# Patient Record
Sex: FEMALE | Race: BLACK OR AFRICAN AMERICAN
[De-identification: names, ages, dates, MRNs, and addresses within clinical notes are randomized per-mention and may not be internally consistent; named-entity substitution may affect disease eponyms.]

---

## 2021-12-13 ENCOUNTER — HOSPITAL ENCOUNTER (OUTPATIENT)
Dept: HOSPITAL 19 - COL.CAR | Age: 66
Discharge: HOME | End: 2021-12-13
Attending: INTERNAL MEDICINE
Payer: MEDICAID

## 2021-12-13 VITALS — SYSTOLIC BLOOD PRESSURE: 145 MMHG | HEART RATE: 55 BPM | DIASTOLIC BLOOD PRESSURE: 90 MMHG

## 2021-12-13 VITALS — TEMPERATURE: 98 F | HEART RATE: 58 BPM | DIASTOLIC BLOOD PRESSURE: 92 MMHG | SYSTOLIC BLOOD PRESSURE: 152 MMHG

## 2021-12-13 VITALS — DIASTOLIC BLOOD PRESSURE: 102 MMHG | HEART RATE: 79 BPM | SYSTOLIC BLOOD PRESSURE: 133 MMHG

## 2021-12-13 VITALS — SYSTOLIC BLOOD PRESSURE: 139 MMHG | DIASTOLIC BLOOD PRESSURE: 100 MMHG | HEART RATE: 68 BPM

## 2021-12-13 VITALS — SYSTOLIC BLOOD PRESSURE: 152 MMHG | HEART RATE: 58 BPM | DIASTOLIC BLOOD PRESSURE: 92 MMHG

## 2021-12-13 VITALS — WEIGHT: 188.5 LBS | BODY MASS INDEX: 31.4 KG/M2 | HEIGHT: 65 IN

## 2021-12-13 VITALS — DIASTOLIC BLOOD PRESSURE: 94 MMHG | SYSTOLIC BLOOD PRESSURE: 131 MMHG | HEART RATE: 82 BPM

## 2021-12-13 VITALS — SYSTOLIC BLOOD PRESSURE: 154 MMHG | HEART RATE: 81 BPM | DIASTOLIC BLOOD PRESSURE: 126 MMHG

## 2021-12-13 VITALS — HEART RATE: 59 BPM | DIASTOLIC BLOOD PRESSURE: 94 MMHG | SYSTOLIC BLOOD PRESSURE: 139 MMHG

## 2021-12-13 VITALS — DIASTOLIC BLOOD PRESSURE: 111 MMHG | HEART RATE: 55 BPM | SYSTOLIC BLOOD PRESSURE: 156 MMHG

## 2021-12-13 VITALS — HEART RATE: 90 BPM | DIASTOLIC BLOOD PRESSURE: 110 MMHG | SYSTOLIC BLOOD PRESSURE: 130 MMHG

## 2021-12-13 DIAGNOSIS — I50.22: ICD-10-CM

## 2021-12-13 DIAGNOSIS — I25.10: Primary | ICD-10-CM

## 2021-12-13 DIAGNOSIS — I11.0: ICD-10-CM

## 2021-12-13 DIAGNOSIS — E78.5: ICD-10-CM

## 2021-12-13 DIAGNOSIS — M54.50: ICD-10-CM

## 2021-12-13 DIAGNOSIS — Z79.899: ICD-10-CM

## 2021-12-13 DIAGNOSIS — M17.0: ICD-10-CM

## 2021-12-13 DIAGNOSIS — K21.9: ICD-10-CM

## 2021-12-13 DIAGNOSIS — Z79.82: ICD-10-CM

## 2021-12-13 DIAGNOSIS — I25.2: ICD-10-CM

## 2021-12-13 LAB
ANION GAP SERPL CALC-SCNC: 8 MMOL/L (ref 7–16)
APTT PPP: 29.8 SECONDS (ref 26–37)
BUN SERPL-MCNC: 15 MG/DL (ref 10–20)
CALCIUM SERPL-MCNC: 9.5 MG/DL (ref 8.4–10.2)
CHLORIDE SERPL-SCNC: 109 MMOL/L (ref 98–107)
CO2 SERPL-SCNC: 23 MMOL/L (ref 23–31)
CREAT SERPL-SCNC: 0.69 MG/DL (ref 0.57–1.11)
ERYTHROCYTE [DISTWIDTH] IN BLOOD BY AUTOMATED COUNT: 13.8 % (ref 11.5–14.5)
GLUCOSE SERPL-MCNC: 104 MG/DL (ref 70–99)
HCT VFR BLD AUTO: 38.8 % (ref 37–47)
HGB BLD-MCNC: 12.8 G/DL (ref 12.5–16)
INR BLD: 1.2 (ref 0.8–3)
MCH RBC QN AUTO: 26 PG (ref 27–31)
MCHC RBC AUTO-ENTMCNC: 33 G/DL (ref 33–37)
MCV RBC AUTO: 78 FL (ref 80–100)
PLATELET # BLD AUTO: 128 K/MM3 (ref 130–400)
PMV BLD AUTO: 12.6 FL (ref 7.4–10.4)
POTASSIUM SERPL-SCNC: 3.7 MMOL/L (ref 3.5–4.5)
PROTHROMBIN TIME: 13.6 SECONDS (ref 9.7–12.8)
RBC # BLD AUTO: 5 M/MM3 (ref 4.1–5.3)
SODIUM SERPL-SCNC: 140 MMOL/L (ref 136–145)

## 2021-12-13 PROCEDURE — C1769 GUIDE WIRE: HCPCS

## 2021-12-13 NOTE — NUR
PRE PROCEDURE ASSESSMENT COMPLETED IN [facility]. SEE MERGE FOR ALL INTRA
PROCEDUREAL MEDICATION ADMINISTRATION TIMES/DOSAGES AND INTRA/POST SEDATION
ASSESSMENTS.

## 2021-12-13 NOTE — NUR
Pt assisted out to friend's car by wheelchair with belongings. Air was removed
from TR band in 2ml increments with no bleeding or complication. Rt radial
puncture site dressed with folded 2x2 and bandaid. Pt is steady on feet in
room. INT DC'd with catheter intact. She expressed understanding of DC
instructions.